# Patient Record
Sex: MALE | Race: BLACK OR AFRICAN AMERICAN | Employment: UNEMPLOYED | URBAN - METROPOLITAN AREA
[De-identification: names, ages, dates, MRNs, and addresses within clinical notes are randomized per-mention and may not be internally consistent; named-entity substitution may affect disease eponyms.]

---

## 2022-02-18 ENCOUNTER — OFFICE VISIT (OUTPATIENT)
Dept: NEUROLOGY | Age: 62
End: 2022-02-18
Payer: MEDICAID

## 2022-02-18 VITALS
BODY MASS INDEX: 27.94 KG/M2 | HEART RATE: 103 BPM | OXYGEN SATURATION: 95 % | DIASTOLIC BLOOD PRESSURE: 80 MMHG | WEIGHT: 178 LBS | HEIGHT: 67 IN | SYSTOLIC BLOOD PRESSURE: 130 MMHG

## 2022-02-18 DIAGNOSIS — F20.0 PARANOID SCHIZOPHRENIA (HCC): Primary | ICD-10-CM

## 2022-02-18 PROCEDURE — 99203 OFFICE O/P NEW LOW 30 MIN: CPT | Performed by: PSYCHIATRY & NEUROLOGY

## 2022-02-18 RX ORDER — AMLODIPINE BESYLATE 10 MG/1
TABLET ORAL
COMMUNITY
Start: 2021-12-11

## 2022-02-18 RX ORDER — BUPRENORPHINE AND NALOXONE 8; 2 MG/1; MG/1
FILM, SOLUBLE BUCCAL; SUBLINGUAL
COMMUNITY
Start: 2021-12-10

## 2022-02-18 RX ORDER — DIVALPROEX SODIUM 250 MG/1
TABLET, DELAYED RELEASE ORAL
COMMUNITY
Start: 2021-12-07

## 2022-02-18 RX ORDER — BUPROPION HYDROCHLORIDE 100 MG/1
TABLET, EXTENDED RELEASE ORAL
COMMUNITY
Start: 2021-11-25

## 2022-02-18 RX ORDER — HYDROXYZINE 25 MG/1
TABLET, FILM COATED ORAL
COMMUNITY
Start: 2021-12-11

## 2022-02-18 RX ORDER — TRAZODONE HYDROCHLORIDE 50 MG/1
50 TABLET ORAL
COMMUNITY
Start: 2022-01-16

## 2022-02-18 NOTE — PROGRESS NOTES
[facility]   NEW PATIENT EVALUATION/CONSULTATION       PATIENT NAME: Sayda Bryson    MRN: [id number]    REASON FOR CONSULTATION: Reported stroke in October 2021, cognitive/psychiatric disturbance    02/18/22    HISTORY OF PRESENT ILLNESS:  Sayda Bryson is a 64 y.o. right-hand-dominant male presents to [facility] neurology clinic for evaluation of ongoing cognitive and psychiatric disturbance in the setting of reported stroke in October 2021. Apparently does not 21, patient was found mopping the floor with overflowing water and seemingly out of sorts. His friend whom he lives with brought him to the hospital where he reportedly was diagnosed with a \"series of strokes\" and he required to stay in hospital for 3 weeks to manage his medications though these medicines largely appear to be psychiatric in nature. Does not appear to be any physical deficit the time. However since that time patient has been different is reported to be like a little child and do things which is friend does not want to discuss. He also has hallucinations which reemerge anytime he misses his psychotropic medication and is known to wander and leave the house for days on end. History is somewhat vague initially friend says that he had no mental health disorder before this but then when inquiring about his homelessness status and asking if he happened to be schizophrenic she then later says that he was diagnosed with paranoid schizophrenia in the past or bipolar disorder. Since hospitalization, he has not been seen by anyone apparently he has a mental health provider who would speak to him on the phone but never sees him in person. Not clear who is managing his medicines as he also has a primary care whom they apparently have not seen since discharge either. Unfortunately patient and friend do not have any records of the hospital stay and really unable to provide much information.   Patient subjectively says that things are fine and does not offer any input      PAST MEDICAL HISTORY:  Bipolar disorder/paranoid schizophrenia  Tobacco dependence    PAST SURGICAL HISTORY:  No significant past surgical history reported    FAMILY HISTORY:   Mother with history of dementia      SOCIAL HISTORY:  Social History     Socioeconomic History    Marital status: SINGLE         MEDICATIONS:   Current Outpatient Medications   Medication Sig Dispense Refill    amLODIPine (NORVASC) 10 mg tablet       buprenorphine-naloxone (Suboxone) 8-2 mg film sublingaul film       buPROPion SR (WELLBUTRIN SR) 100 mg SR tablet       divalproex DR (DEPAKOTE) 250 mg tablet       hydrOXYzine HCL (ATARAX) 25 mg tablet       traZODone (DESYREL) 50 mg tablet Take 50 mg by mouth nightly. ALLERGIES:  Not on File      REVIEW OF SYSTEMS:  10 point ROS reviewed with patient. Please see scanned document under media. PHYSICAL EXAM:  Vital Signs:   Visit Vitals  /80   Pulse (!) 103   Ht 5' 7\" (1.702 m)   Wt 178 lb (80.7 kg)   SpO2 95%   BMI 27.88 kg/m²     Middle-age male, appearing stated age sitting in bed intermittently closing his eyes appearing to withdraw. HEENT appears grossly unremarkable neck appears supple. Cardiopulmonary exams appear grossly unremarkable abdomen is nondistended. Extremities are warm/dry. Neurologically, patient appears oriented to self, knows he is in the hospital/doctor's office but not which one. Response [place] off my coat. States the month is October. When given multiple choice he says \"Oh so we are playing a guessing game\" and does not offer input. Knows the year is 2022. Attention is impaired to casual conversation. Speech is slow and monosymbolic. Language is nonfluent and he follows basic commands. Cranial nerves II through XII appear grossly unremarkable. Motorically patient has equal strength in upper and lower extremities. Coordination is intact upper extremities. Reflexes appear symmetric. Remainder of examination is deferred    PERTINENT DATA:  None    ASSESSMENT:      Amy Hobbs is a 79-year-old right-hand-dominant male with a history of bipolar disorder/paranoid schizophrenia and reported strokes in October 2021 who presents to [facility] neurology clinic for evaluation of mental and behavioral derangement    PLAN:  Disorganized thought, hallucinations:  Patient's friend reports this occurred after a \"series of strokes\" in October 2021  Unfortunately there are no records available for review she really has not particularly knowledgeable and what was discovered during hospital stay and patient is essentially unable to write any history  Discussed with friend that certainly cognitive changes, hallucinations are possible with strokes but they are not the only cause and his story is somewhat odd  Mention also that even if his initial symptoms arose from stroke behavioral management is best done through psychiatry  History is further confounded by a back history of either paranoid schizophrenia or bipolar disorder with psychotic features  We will refer to psychiatry given unhappiness with current health mental health providers we will request records for review  Pending results of record review may or may not plan follow-up, patient should be seen by his primary care as its been 5 months and they state this is the first office they been to since hospitalization    Follow-up PRN Leopold Penner, MD

## 2022-03-01 ENCOUNTER — TELEPHONE (OUTPATIENT)
Dept: NEUROLOGY | Age: 62
End: 2022-03-01

## 2022-03-01 DIAGNOSIS — I63.9 CEREBROVASCULAR ACCIDENT (CVA), UNSPECIFIED MECHANISM (HCC): Primary | ICD-10-CM

## 2022-03-01 NOTE — TELEPHONE ENCOUNTER
Pt's caretaker called to schedule MRI and wants to know If the hospital has sent the discharge paperwork over so that another appointment can be made. Please call back.

## 2022-03-04 NOTE — TELEPHONE ENCOUNTER
I called and notified the caregiver of most of the information below: no need for follow up at this time and that imaging was ordered. That this should be followed up by psychiatry.  No patient identifiers given

## 2022-03-04 NOTE — TELEPHONE ENCOUNTER
Sherly Deem     This patient needs to work with psychiatry, no need for follow-up. Will repeat MRI, MRA but unless results are concerning nothing I can do from neurology standpoint no reason to come back. Received records, there was not any though that stroke caused psychiatric symptoms, patient cocaine positive at time.      merari    Message text

## 2022-04-01 ENCOUNTER — HOSPITAL ENCOUNTER (OUTPATIENT)
Dept: MRI IMAGING | Age: 62
Discharge: HOME | End: 2022-04-01
Attending: PSYCHIATRY & NEUROLOGY
Payer: MEDICAID

## 2022-04-01 ENCOUNTER — TELEPHONE (OUTPATIENT)
Dept: NEUROLOGY | Age: 62
End: 2022-04-01

## 2022-04-01 DIAGNOSIS — I63.9 CEREBROVASCULAR ACCIDENT (CVA), UNSPECIFIED MECHANISM (HCC): ICD-10-CM

## 2022-04-01 PROCEDURE — 70551 MRI BRAIN STEM W/O DYE: CPT

## 2022-04-01 PROCEDURE — 70544 MR ANGIOGRAPHY HEAD W/O DYE: CPT

## 2022-04-01 NOTE — TELEPHONE ENCOUNTER
----- Message from Konrad Heimlich, MD sent at 4/1/2022  1:40 PM EDT -----  Kyle Harp    Can we let Mr. Monroy/his caregiver know that other than chronic vascular injury his MRI is unremarkable.  Recommendations are to avoid illicit substances/not smoke, take baby aspirin (81mg) and to follow up with psychiatry for his mental comorbidities which was the main concern in the office.    Denson Mohs